# Patient Record
Sex: FEMALE | Race: WHITE | NOT HISPANIC OR LATINO | URBAN - METROPOLITAN AREA
[De-identification: names, ages, dates, MRNs, and addresses within clinical notes are randomized per-mention and may not be internally consistent; named-entity substitution may affect disease eponyms.]

---

## 2019-02-26 ENCOUNTER — APPOINTMENT (RX ONLY)
Dept: URBAN - METROPOLITAN AREA CLINIC 88 | Facility: CLINIC | Age: 49
Setting detail: DERMATOLOGY
End: 2019-02-26

## 2019-02-26 DIAGNOSIS — L71.8 OTHER ROSACEA: ICD-10-CM

## 2019-02-26 DIAGNOSIS — I78.8 OTHER DISEASES OF CAPILLARIES: ICD-10-CM

## 2019-02-26 DIAGNOSIS — L82.1 OTHER SEBORRHEIC KERATOSIS: ICD-10-CM

## 2019-02-26 PROBLEM — E03.9 HYPOTHYROIDISM, UNSPECIFIED: Status: ACTIVE | Noted: 2019-02-26

## 2019-02-26 PROBLEM — L29.8 OTHER PRURITUS: Status: ACTIVE | Noted: 2019-02-26

## 2019-02-26 PROBLEM — L85.3 XEROSIS CUTIS: Status: ACTIVE | Noted: 2019-02-26

## 2019-02-26 PROCEDURE — ? OTHER

## 2019-02-26 PROCEDURE — ? COUNSELING

## 2019-02-26 PROCEDURE — 99202 OFFICE O/P NEW SF 15 MIN: CPT

## 2019-02-26 PROCEDURE — ? PRESCRIPTION

## 2019-02-26 RX ORDER — DOXYCYCLINE HYCLATE 100 MG/1
TABLET, COATED ORAL
Qty: 60 | Refills: 3 | Status: ERX | COMMUNITY
Start: 2019-02-26

## 2019-02-26 RX ORDER — IVERMECTIN 10 MG/G
CREAM TOPICAL
Qty: 1 | Refills: 3 | Status: ERX | COMMUNITY
Start: 2019-02-26

## 2019-02-26 RX ADMIN — IVERMECTIN: 10 CREAM TOPICAL at 17:15

## 2019-02-26 RX ADMIN — DOXYCYCLINE HYCLATE: 100 TABLET, COATED ORAL at 17:15

## 2019-02-26 ASSESSMENT — LOCATION ZONE DERM
LOCATION ZONE: NOSE
LOCATION ZONE: FACE
LOCATION ZONE: TRUNK
LOCATION ZONE: LEG

## 2019-02-26 ASSESSMENT — LOCATION DETAILED DESCRIPTION DERM
LOCATION DETAILED: RIGHT PROXIMAL PRETIBIAL REGION
LOCATION DETAILED: NASAL TIP
LOCATION DETAILED: LEFT INFERIOR FOREHEAD
LOCATION DETAILED: RIGHT INFERIOR UPPER BACK
LOCATION DETAILED: NASAL SUPRATIP
LOCATION DETAILED: LEFT SUPERIOR UPPER BACK
LOCATION DETAILED: RIGHT CENTRAL ZYGOMA
LOCATION DETAILED: RIGHT MEDIAL MALAR CHEEK

## 2019-02-26 ASSESSMENT — LOCATION SIMPLE DESCRIPTION DERM
LOCATION SIMPLE: RIGHT CHEEK
LOCATION SIMPLE: RIGHT PRETIBIAL REGION
LOCATION SIMPLE: NOSE
LOCATION SIMPLE: LEFT UPPER BACK
LOCATION SIMPLE: LEFT FOREHEAD
LOCATION SIMPLE: RIGHT ZYGOMA
LOCATION SIMPLE: RIGHT UPPER BACK

## 2019-02-26 NOTE — PROCEDURE: OTHER
Note Text (......Xxx Chief Complaint.): This diagnosis correlates with the
Other (Free Text): Referred patient to see ophthalmologist for eyes
Detail Level: Zone
Other (Free Text): Pt has some reported eye dryness, none today. Pt to f/ u w ophthalmologist.